# Patient Record
Sex: MALE | Race: WHITE | NOT HISPANIC OR LATINO | URBAN - METROPOLITAN AREA
[De-identification: names, ages, dates, MRNs, and addresses within clinical notes are randomized per-mention and may not be internally consistent; named-entity substitution may affect disease eponyms.]

---

## 2020-07-25 ENCOUNTER — TELEPHONE ENCOUNTER (OUTPATIENT)
Dept: URBAN - METROPOLITAN AREA CLINIC 13 | Facility: CLINIC | Age: 60
End: 2020-07-25

## 2020-07-25 RX ORDER — POLYETHYLENE GLYCOL 3350, SODIUM SULFATE, SODIUM CHLORIDE, POTASSIUM CHLORIDE, ASCORBIC ACID, SODIUM ASCORBATE 7.5-2.691G
DRINK HALF OF THE SOLUTION AT 5 PM THE DAY BEFORE PROCEDURE AND THEN LAST HALF 6 HOURS PRIOR TO PROCEDURE KIT ORAL
Qty: 1 | Refills: 0 | OUTPATIENT
Start: 2018-08-03 | End: 2018-08-15

## 2020-07-26 ENCOUNTER — TELEPHONE ENCOUNTER (OUTPATIENT)
Dept: URBAN - METROPOLITAN AREA CLINIC 13 | Facility: CLINIC | Age: 60
End: 2020-07-26

## 2020-07-26 RX ORDER — MULTIVITAMIN
TAKE 1 TABLET DAILY TABLET ORAL
Refills: 0 | Status: ACTIVE | COMMUNITY
Start: 2018-05-30

## 2020-07-26 RX ORDER — TESTOSTERONE 16.2 MG/G
GEL TRANSDERMAL
Qty: 75 | Refills: 0 | Status: ACTIVE | COMMUNITY
Start: 2017-11-25

## 2020-07-26 RX ORDER — ZOLPIDEM TARTRATE 10 MG/1
TABLET, FILM COATED ORAL
Qty: 30 | Refills: 0 | Status: ACTIVE | COMMUNITY
Start: 2018-07-11

## 2020-07-26 RX ORDER — THIAMINE MONONITRATE (VIT B1) 100 MG
TAKE 1 TABLET DAILY TABLET ORAL
Refills: 0 | Status: ACTIVE | COMMUNITY
Start: 2018-05-30

## 2020-07-26 RX ORDER — DESONIDE 0.5 MG/G
APPLY INCH  PRN CREAM TOPICAL
Refills: 0 | Status: ACTIVE | COMMUNITY

## 2020-07-26 RX ORDER — TESTOSTERONE 16.2 MG/G
GEL TRANSDERMAL
Qty: 75 | Refills: 0 | Status: ACTIVE | COMMUNITY
Start: 2017-12-29

## 2020-07-26 RX ORDER — BUPROPION HYDROCHLORIDE 150 MG/1
TABLET, FILM COATED, EXTENDED RELEASE ORAL
Qty: 30 | Refills: 0 | Status: ACTIVE | COMMUNITY
Start: 2017-11-23

## 2020-07-26 RX ORDER — DORZOLAMIDE/TIMOLOL/PF 2 %-0.5 %
INSTILL 1 DROP INTO BOTH EYES 2 TIMES DAILY DROPS OPHTHALMIC (EYE)
Refills: 0 | Status: ACTIVE | COMMUNITY
Start: 2018-01-01

## 2020-07-26 RX ORDER — TESTOSTERONE 16.2 MG/G
GEL TRANSDERMAL
Refills: 0 | Status: ACTIVE | COMMUNITY
Start: 2018-04-06

## 2020-07-26 RX ORDER — MELOXICAM 7.5 MG/1
TABLET ORAL
Qty: 60 | Refills: 0 | Status: ACTIVE | COMMUNITY
Start: 2017-12-13

## 2020-07-26 RX ORDER — CLOBETASOL PROPIONATE 0.5 MG/ML
SOLUTION TOPICAL
Qty: 50 | Refills: 0 | Status: ACTIVE | COMMUNITY
Start: 2018-03-21

## 2020-07-26 RX ORDER — MELOXICAM 7.5 MG/1
TABLET ORAL
Qty: 180 | Refills: 0 | Status: ACTIVE | COMMUNITY
Start: 2018-01-16

## 2020-07-26 RX ORDER — DORZOLAMIDE/TIMOLOL/PF 2 %-0.5 %
DROPS OPHTHALMIC (EYE)
Qty: 30 | Refills: 0 | Status: ACTIVE | COMMUNITY
Start: 2018-02-28

## 2020-07-26 RX ORDER — BIMATOPROST 0.1 MG/ML
SOLUTION/ DROPS OPHTHALMIC
Qty: 2 | Refills: 0 | Status: ACTIVE | COMMUNITY
Start: 2017-11-25

## 2020-07-26 RX ORDER — LISINOPRIL 40 MG/1
TABLET ORAL
Qty: 90 | Refills: 0 | Status: ACTIVE | COMMUNITY
Start: 2018-07-31

## 2020-07-26 RX ORDER — CALCIPOTRIENE AND BETAMETHASONE DIPROPIONATE 50; .5 UG/G; MG/G
OINTMENT TOPICAL
Qty: 60 | Refills: 0 | Status: ACTIVE | COMMUNITY
Start: 2018-04-20

## 2020-07-26 RX ORDER — CLOBETASOL PROPIONATE 0.5 MG/ML
SOLUTION TOPICAL
Qty: 50 | Refills: 0 | Status: ACTIVE | COMMUNITY
Start: 2018-01-12

## 2020-07-26 RX ORDER — BIMATOPROST 0.1 MG/ML
INSTILL 1 DROP INTO BOTH EYES ONCE DAILY IN THE EVENING SOLUTION/ DROPS OPHTHALMIC
Refills: 0 | Status: ACTIVE | COMMUNITY
Start: 2018-04-04

## 2020-07-26 RX ORDER — LISINOPRIL 20 MG/1
TAKE 1 TABLET TWICE DAILY TABLET ORAL
Refills: 0 | Status: ACTIVE | COMMUNITY
Start: 2018-05-30

## 2020-07-26 RX ORDER — CLOBETASOL PROPIONATE 0.5 MG/G
APPLY INCH  PRN AEROSOL, FOAM TOPICAL
Refills: 0 | Status: ACTIVE | COMMUNITY
Start: 2017-11-29

## 2024-01-12 ENCOUNTER — OFFICE VISIT (OUTPATIENT)
Dept: URBAN - METROPOLITAN AREA CLINIC 113 | Facility: CLINIC | Age: 64
End: 2024-01-12

## 2024-01-25 ENCOUNTER — OFFICE VISIT (OUTPATIENT)
Dept: URBAN - METROPOLITAN AREA CLINIC 113 | Facility: CLINIC | Age: 64
End: 2024-01-25

## 2024-02-23 ENCOUNTER — OV CON (OUTPATIENT)
Dept: URBAN - METROPOLITAN AREA CLINIC 113 | Facility: CLINIC | Age: 64
End: 2024-02-23
Payer: MEDICARE

## 2024-02-23 ENCOUNTER — LAB (OUTPATIENT)
Dept: URBAN - METROPOLITAN AREA CLINIC 113 | Facility: CLINIC | Age: 64
End: 2024-02-23

## 2024-02-23 VITALS
DIASTOLIC BLOOD PRESSURE: 79 MMHG | HEART RATE: 82 BPM | WEIGHT: 286 LBS | BODY MASS INDEX: 38.74 KG/M2 | TEMPERATURE: 97.9 F | RESPIRATION RATE: 18 BRPM | SYSTOLIC BLOOD PRESSURE: 129 MMHG | HEIGHT: 72 IN

## 2024-02-23 DIAGNOSIS — Z86.010 HISTORY OF ADENOMATOUS POLYP OF COLON: ICD-10-CM

## 2024-02-23 DIAGNOSIS — R19.7 DIARRHEA, UNSPECIFIED TYPE: ICD-10-CM

## 2024-02-23 PROBLEM — 429047008: Status: ACTIVE | Noted: 2024-02-23

## 2024-02-23 PROCEDURE — 99203 OFFICE O/P NEW LOW 30 MIN: CPT | Performed by: NURSE PRACTITIONER

## 2024-02-23 RX ORDER — CALCIPOTRIENE AND BETAMETHASONE DIPROPIONATE 50; .5 UG/G; MG/G
OINTMENT TOPICAL
Qty: 60 | Refills: 0 | Status: ACTIVE | COMMUNITY
Start: 2018-04-20

## 2024-02-23 RX ORDER — LISINOPRIL 40 MG/1
TABLET ORAL
Qty: 90 | Refills: 0 | Status: ON HOLD | COMMUNITY
Start: 2018-07-31

## 2024-02-23 RX ORDER — AMLODIPINE BESYLATE 10 MG/1
1 TABLET TABLET ORAL ONCE A DAY
Status: ACTIVE | COMMUNITY

## 2024-02-23 RX ORDER — THIAMINE MONONITRATE (VIT B1) 100 MG
TAKE 1 TABLET DAILY TABLET ORAL
Refills: 0 | Status: ACTIVE | COMMUNITY
Start: 2018-05-30

## 2024-02-23 RX ORDER — LATANOPROSTENE BUNOD 0.24 MG/ML
1 DROP INTO AFFECTED EYE IN THE EVENING SOLUTION/ DROPS OPHTHALMIC ONCE A DAY
Status: ACTIVE | COMMUNITY

## 2024-02-23 RX ORDER — VALSARTAN 160 MG/1
1 TABLET TABLET, FILM COATED ORAL ONCE A DAY
Status: ACTIVE | COMMUNITY

## 2024-02-23 RX ORDER — MELOXICAM 7.5 MG/1
TABLET ORAL
Qty: 60 | Refills: 0 | Status: ON HOLD | COMMUNITY
Start: 2017-12-13

## 2024-02-23 RX ORDER — CLOBETASOL PROPIONATE 0.5 MG/G
APPLY INCH  PRN AEROSOL, FOAM TOPICAL
Refills: 0 | Status: ACTIVE | COMMUNITY
Start: 2017-11-29

## 2024-02-23 RX ORDER — CLOBETASOL PROPIONATE 0.5 MG/ML
SOLUTION TOPICAL
Qty: 50 | Refills: 0 | Status: ON HOLD | COMMUNITY
Start: 2018-01-12

## 2024-02-23 RX ORDER — DESONIDE 0.5 MG/G
APPLY INCH  PRN CREAM TOPICAL
Refills: 0 | Status: ACTIVE | COMMUNITY

## 2024-02-23 RX ORDER — ZOLPIDEM TARTRATE 10 MG/1
TABLET, FILM COATED ORAL
Qty: 30 | Refills: 0 | Status: ACTIVE | COMMUNITY
Start: 2018-07-11

## 2024-02-23 RX ORDER — LISINOPRIL 20 MG/1
TAKE 1 TABLET TWICE DAILY TABLET ORAL
Refills: 0 | Status: ON HOLD | COMMUNITY
Start: 2018-05-30

## 2024-02-23 RX ORDER — TESTOSTERONE 16.2 MG/G
GEL TRANSDERMAL
Qty: 75 | Refills: 0 | Status: ON HOLD | COMMUNITY
Start: 2017-11-25

## 2024-02-23 RX ORDER — DORZOLAMIDE/TIMOLOL/PF 2 %-0.5 %
INSTILL 1 DROP INTO BOTH EYES 2 TIMES DAILY DROPS OPHTHALMIC (EYE)
Refills: 0 | Status: ACTIVE | COMMUNITY
Start: 2018-01-01

## 2024-02-23 RX ORDER — TESTOSTERONE 16.2 MG/G
GEL TRANSDERMAL
Qty: 75 | Refills: 0 | Status: ON HOLD | COMMUNITY
Start: 2017-12-29

## 2024-02-23 RX ORDER — MULTIVITAMIN
TAKE 1 TABLET DAILY TABLET ORAL
Refills: 0 | Status: ACTIVE | COMMUNITY
Start: 2018-05-30

## 2024-02-23 RX ORDER — BIMATOPROST 0.1 MG/ML
SOLUTION/ DROPS OPHTHALMIC
Qty: 2 | Refills: 0 | Status: ON HOLD | COMMUNITY
Start: 2017-11-25

## 2024-02-23 RX ORDER — BUPROPION HYDROCHLORIDE 150 MG/1
TABLET, FILM COATED, EXTENDED RELEASE ORAL
Qty: 30 | Refills: 0 | Status: ON HOLD | COMMUNITY
Start: 2017-11-23

## 2024-02-23 NOTE — HPI-TODAY'S VISIT:
This is a 64-year-old male with a history of hypertension, sleep apnea, colon diverticulosis, internal hemorrhoids, and adenomatous colon polyps due surveillance in September 2023 presenting to schedule colonoscopy. He was last seen in the office 9/5/2018 after a colonoscopy notable for removal of a tubular adenoma.  Cecal base was not intubated and previous colonoscopy was complete.  He declined virtual colonoscopy for completion.  Surveillance colonoscopy recommended in 5 years.  He was to begin daily Benefiber for finding of diverticulosis. 18 months ago, he underwent cholecystectomy.  He began to experience diarrhea with urgency to defecate and fecal incontinence 8 months after surgery.  He was prescribed cholestyramine.  He initially took it 3 times a day.  Due to constipation, he has reduced to twice a day.  He is having a daily bowel movement.  He denies urgency.  He denies abdominal pain, red blood per rectum, or any other abdominal symptoms.

## 2024-04-30 ENCOUNTER — COLON (OUTPATIENT)
Dept: URBAN - METROPOLITAN AREA SURGERY CENTER 25 | Facility: SURGERY CENTER | Age: 64
End: 2024-04-30

## 2024-04-30 RX ORDER — VALSARTAN 160 MG/1
1 TABLET TABLET, FILM COATED ORAL ONCE A DAY
Status: ACTIVE | COMMUNITY

## 2024-04-30 RX ORDER — BIMATOPROST 0.1 MG/ML
SOLUTION/ DROPS OPHTHALMIC
Qty: 2 | Refills: 0 | Status: ON HOLD | COMMUNITY
Start: 2017-11-25

## 2024-04-30 RX ORDER — LISINOPRIL 40 MG/1
TABLET ORAL
Qty: 90 | Refills: 0 | Status: ON HOLD | COMMUNITY
Start: 2018-07-31

## 2024-04-30 RX ORDER — MULTIVITAMIN
TAKE 1 TABLET DAILY TABLET ORAL
Refills: 0 | Status: ACTIVE | COMMUNITY
Start: 2018-05-30

## 2024-04-30 RX ORDER — CALCIPOTRIENE AND BETAMETHASONE DIPROPIONATE 50; .5 UG/G; MG/G
OINTMENT TOPICAL
Qty: 60 | Refills: 0 | Status: ACTIVE | COMMUNITY
Start: 2018-04-20

## 2024-04-30 RX ORDER — TESTOSTERONE 16.2 MG/G
GEL TRANSDERMAL
Qty: 75 | Refills: 0 | Status: ON HOLD | COMMUNITY
Start: 2017-11-25

## 2024-04-30 RX ORDER — THIAMINE MONONITRATE (VIT B1) 100 MG
TAKE 1 TABLET DAILY TABLET ORAL
Refills: 0 | Status: ACTIVE | COMMUNITY
Start: 2018-05-30

## 2024-04-30 RX ORDER — AMLODIPINE BESYLATE 10 MG/1
1 TABLET TABLET ORAL ONCE A DAY
Status: ACTIVE | COMMUNITY

## 2024-04-30 RX ORDER — LATANOPROSTENE BUNOD 0.24 MG/ML
1 DROP INTO AFFECTED EYE IN THE EVENING SOLUTION/ DROPS OPHTHALMIC ONCE A DAY
Status: ACTIVE | COMMUNITY

## 2024-04-30 RX ORDER — ZOLPIDEM TARTRATE 10 MG/1
TABLET, FILM COATED ORAL
Qty: 30 | Refills: 0 | Status: ACTIVE | COMMUNITY
Start: 2018-07-11

## 2024-04-30 RX ORDER — CLOBETASOL PROPIONATE 0.5 MG/G
APPLY INCH  PRN AEROSOL, FOAM TOPICAL
Refills: 0 | Status: ACTIVE | COMMUNITY
Start: 2017-11-29

## 2024-04-30 RX ORDER — LISINOPRIL 20 MG/1
TAKE 1 TABLET TWICE DAILY TABLET ORAL
Refills: 0 | Status: ON HOLD | COMMUNITY
Start: 2018-05-30

## 2024-04-30 RX ORDER — CLOBETASOL PROPIONATE 0.5 MG/ML
SOLUTION TOPICAL
Qty: 50 | Refills: 0 | Status: ON HOLD | COMMUNITY
Start: 2018-01-12

## 2024-04-30 RX ORDER — DESONIDE 0.5 MG/G
APPLY INCH  PRN CREAM TOPICAL
Refills: 0 | Status: ACTIVE | COMMUNITY

## 2024-04-30 RX ORDER — TESTOSTERONE 16.2 MG/G
GEL TRANSDERMAL
Qty: 75 | Refills: 0 | Status: ON HOLD | COMMUNITY
Start: 2017-12-29

## 2024-04-30 RX ORDER — DORZOLAMIDE/TIMOLOL/PF 2 %-0.5 %
INSTILL 1 DROP INTO BOTH EYES 2 TIMES DAILY DROPS OPHTHALMIC (EYE)
Refills: 0 | Status: ACTIVE | COMMUNITY
Start: 2018-01-01

## 2024-04-30 RX ORDER — MELOXICAM 7.5 MG/1
TABLET ORAL
Qty: 60 | Refills: 0 | Status: ON HOLD | COMMUNITY
Start: 2017-12-13

## 2024-04-30 RX ORDER — BUPROPION HYDROCHLORIDE 150 MG/1
TABLET, FILM COATED, EXTENDED RELEASE ORAL
Qty: 30 | Refills: 0 | Status: ON HOLD | COMMUNITY
Start: 2017-11-23

## 2024-06-24 ENCOUNTER — OFFICE VISIT (OUTPATIENT)
Dept: URBAN - METROPOLITAN AREA CLINIC 113 | Facility: CLINIC | Age: 64
End: 2024-06-24

## 2024-07-19 ENCOUNTER — OFFICE VISIT (OUTPATIENT)
Dept: URBAN - METROPOLITAN AREA CLINIC 113 | Facility: CLINIC | Age: 64
End: 2024-07-19

## 2024-08-23 ENCOUNTER — DASHBOARD ENCOUNTERS (OUTPATIENT)
Age: 64
End: 2024-08-23

## 2024-08-23 ENCOUNTER — OFFICE VISIT (OUTPATIENT)
Dept: URBAN - METROPOLITAN AREA CLINIC 113 | Facility: CLINIC | Age: 64
End: 2024-08-23
Payer: MEDICARE

## 2024-08-23 VITALS
DIASTOLIC BLOOD PRESSURE: 70 MMHG | HEART RATE: 77 BPM | WEIGHT: 289 LBS | BODY MASS INDEX: 39.14 KG/M2 | TEMPERATURE: 97.9 F | HEIGHT: 72 IN | SYSTOLIC BLOOD PRESSURE: 125 MMHG

## 2024-08-23 DIAGNOSIS — Z86.010 HISTORY OF ADENOMATOUS POLYP OF COLON: ICD-10-CM

## 2024-08-23 DIAGNOSIS — K57.30 COLON, DIVERTICULOSIS: ICD-10-CM

## 2024-08-23 PROCEDURE — 99213 OFFICE O/P EST LOW 20 MIN: CPT | Performed by: NURSE PRACTITIONER

## 2024-08-23 RX ORDER — TESTOSTERONE 16.2 MG/G
GEL TRANSDERMAL
Qty: 75 | Refills: 0 | Status: ON HOLD | COMMUNITY
Start: 2017-12-29

## 2024-08-23 RX ORDER — TESTOSTERONE 16.2 MG/G
GEL TRANSDERMAL
Qty: 75 | Refills: 0 | Status: ON HOLD | COMMUNITY
Start: 2017-11-25

## 2024-08-23 RX ORDER — MULTIVITAMIN
TAKE 1 TABLET DAILY TABLET ORAL
Refills: 0 | Status: ON HOLD | COMMUNITY
Start: 2018-05-30

## 2024-08-23 RX ORDER — AMLODIPINE BESYLATE 10 MG/1
1 TABLET TABLET ORAL ONCE A DAY
Status: ACTIVE | COMMUNITY

## 2024-08-23 RX ORDER — ZOLPIDEM TARTRATE 10 MG/1
TABLET, FILM COATED ORAL
Qty: 30 | Refills: 0 | Status: ON HOLD | COMMUNITY
Start: 2018-07-11

## 2024-08-23 RX ORDER — DESONIDE 0.5 MG/G
APPLY INCH  PRN CREAM TOPICAL
Refills: 0 | Status: ON HOLD | COMMUNITY

## 2024-08-23 RX ORDER — BIMATOPROST 0.1 MG/ML
SOLUTION/ DROPS OPHTHALMIC
Qty: 2 | Refills: 0 | Status: ON HOLD | COMMUNITY
Start: 2017-11-25

## 2024-08-23 RX ORDER — BUPROPION HYDROCHLORIDE 150 MG/1
TABLET, FILM COATED, EXTENDED RELEASE ORAL
Qty: 30 | Refills: 0 | Status: ON HOLD | COMMUNITY
Start: 2017-11-23

## 2024-08-23 RX ORDER — OLMESARTAN MEDOXOMIL 20 MG/1
1 TABLET TABLET, FILM COATED ORAL ONCE A DAY
Status: ACTIVE | COMMUNITY

## 2024-08-23 RX ORDER — DORZOLAMIDE/TIMOLOL/PF 2 %-0.5 %
INSTILL 1 DROP INTO BOTH EYES 2 TIMES DAILY DROPS OPHTHALMIC (EYE)
Refills: 0 | Status: ON HOLD | COMMUNITY
Start: 2018-01-01

## 2024-08-23 RX ORDER — CALCIPOTRIENE AND BETAMETHASONE DIPROPIONATE 50; .5 UG/G; MG/G
OINTMENT TOPICAL
Qty: 60 | Refills: 0 | Status: ON HOLD | COMMUNITY
Start: 2018-04-20

## 2024-08-23 RX ORDER — THIAMINE MONONITRATE (VIT B1) 100 MG
TAKE 1 TABLET DAILY TABLET ORAL
Refills: 0 | Status: ACTIVE | COMMUNITY
Start: 2018-05-30

## 2024-08-23 RX ORDER — VALSARTAN 160 MG/1
1 TABLET TABLET, FILM COATED ORAL ONCE A DAY
Status: ON HOLD | COMMUNITY

## 2024-08-23 RX ORDER — LISINOPRIL 40 MG/1
TABLET ORAL
Qty: 90 | Refills: 0 | Status: ON HOLD | COMMUNITY
Start: 2018-07-31

## 2024-08-23 RX ORDER — MELOXICAM 7.5 MG/1
TABLET ORAL
Qty: 60 | Refills: 0 | Status: ON HOLD | COMMUNITY
Start: 2017-12-13

## 2024-08-23 RX ORDER — CLOBETASOL PROPIONATE 0.5 MG/ML
SOLUTION TOPICAL
Qty: 50 | Refills: 0 | Status: ON HOLD | COMMUNITY
Start: 2018-01-12

## 2024-08-23 RX ORDER — LATANOPROSTENE BUNOD 0.24 MG/ML
1 DROP INTO AFFECTED EYE IN THE EVENING SOLUTION/ DROPS OPHTHALMIC ONCE A DAY
Status: ACTIVE | COMMUNITY

## 2024-08-23 RX ORDER — LISINOPRIL 20 MG/1
TAKE 1 TABLET TWICE DAILY TABLET ORAL
Refills: 0 | Status: ON HOLD | COMMUNITY
Start: 2018-05-30

## 2024-08-23 RX ORDER — CLOBETASOL PROPIONATE 0.5 MG/G
APPLY INCH  PRN AEROSOL, FOAM TOPICAL
Refills: 0 | Status: ON HOLD | COMMUNITY
Start: 2017-11-29

## 2024-08-23 NOTE — HPI-OTHER HISTORIES
Colonoscopy 4/30/2024:BBPS 9 (MiraLAX), sigmoid/transverse/descending diverticulosis, removal of a 6 mm descending sessile polyp, otherwise normal to the terminal ileum. Pathology: Descending polyp was a tubular adenoma. Surveillance recommended in 2029. recall set

## 2024-08-23 NOTE — HPI-TODAY'S VISIT:
This is a 64-year-old male with a history of hypertension, sleep apnea, colon diverticulosis, internal hemorrhoids, and adenomatous colon polyps due surveillance in 2029 presenting for follow-up after colonoscopy.  Colonoscopy 4/30/2024:BBPS 9 (MiraLAX), sigmoid/transverse/descending diverticulosis, removal of a 6 mm descending sessile polyp, otherwise normal to the terminal ileum.  Pathology: Descending polyp was a tubular adenoma.  Surveillance recommended in 2029.  He is doing well.  He denies any abdominal symptoms.  Over a year ago, he had diarrhea which persisted until he took cholestyramine.  This was effective.  He is no longer requiring it and is having regular bowel movements.  He denies changes in his medical history.  He plans to discuss weight loss options with his primary care physician.

## 2024-08-25 PROBLEM — 733657002: Status: ACTIVE | Noted: 2024-08-25
